# Patient Record
Sex: FEMALE | Race: ASIAN | NOT HISPANIC OR LATINO
[De-identification: names, ages, dates, MRNs, and addresses within clinical notes are randomized per-mention and may not be internally consistent; named-entity substitution may affect disease eponyms.]

---

## 2022-07-06 ENCOUNTER — APPOINTMENT (OUTPATIENT)
Dept: ORTHOPEDIC SURGERY | Facility: CLINIC | Age: 67
End: 2022-07-06

## 2022-07-06 VITALS — WEIGHT: 129 LBS | HEIGHT: 61 IN | BODY MASS INDEX: 24.35 KG/M2

## 2022-07-06 PROBLEM — Z00.00 ENCOUNTER FOR PREVENTIVE HEALTH EXAMINATION: Status: ACTIVE | Noted: 2022-07-06

## 2022-07-06 PROCEDURE — 73000 X-RAY EXAM OF COLLAR BONE: CPT | Mod: RT

## 2022-07-06 PROCEDURE — 99204 OFFICE O/P NEW MOD 45 MIN: CPT | Mod: 25

## 2022-07-06 PROCEDURE — 23500 CLTX CLAVICULAR FX W/O MNPJ: CPT | Mod: LT

## 2022-07-06 PROCEDURE — 73030 X-RAY EXAM OF SHOULDER: CPT | Mod: LT

## 2022-07-06 RX ORDER — DICLOFENAC SODIUM 3 G/100G
3 GEL TOPICAL TWICE DAILY
Qty: 1 | Refills: 3 | Status: ACTIVE | COMMUNITY
Start: 2022-07-06 | End: 1900-01-01

## 2022-07-06 RX ORDER — DICLOFENAC SODIUM 75 MG/1
75 TABLET, DELAYED RELEASE ORAL TWICE DAILY
Qty: 60 | Refills: 0 | Status: ACTIVE | COMMUNITY
Start: 2022-07-06 | End: 1900-01-01

## 2022-07-11 NOTE — HISTORY OF PRESENT ILLNESS
[Sudden] : sudden [Dull/Aching] : dull/aching [Sharp] : sharp [Constant] : constant [Sleep] : sleep [Nothing helps with pain getting better] : Nothing helps with pain getting better [de-identified] : 07/06/2022 \par \par Occupation: teacher\par \par Ms. CUMMINS is a 66 year-old RHD F presenting today for left shoulder pain. Pain and symptoms started 6/24/2022 after a fall down stairs. She has tried using a sling. She denies any numbness/tingling/fevers/chills. \par \par Previous Imaging: x-rays [] : no [FreeTextEntry1] : left clavicle  [FreeTextEntry3] : 06/24/22 [FreeTextEntry5] : onset of pain after falling down a flight of stairs at work on 06/24/22. pt went to [facility] after. received xrays which revealed a fx  [de-identified] : movement  [de-identified] : 06/24/22 [de-identified] : outside [facility] [de-identified] : xrays

## 2022-07-11 NOTE — DISCUSSION/SUMMARY
[Surgical risks reviewed] : Surgical risks reviewed [de-identified] : Discussed r/b/a to nonop versus op treatment\par \par Decided for nonoop management\par \par Instructions: Dx / Natural History\par The patient was advised of the diagnosis.  The natural history of the pathology was explained in full to the patient in layman's terms.  Several different treatment options were discussed and explained in full to the patient including the risks and benefits of both surgical and non-surgical treatments.  All questions and concerns were answered. \par \par RICE\par I explained to the patient that rest, ice, compression, and elevation would benefit them.  They may return to activity after follow-up or when they no longer have any pain.\par \par NSAIDs - OTC\par Patient is to begin over the counter oral anti-inflammatory medications on an as needed basis, as long as there are no medical contraindications.  Patient is counseled on possible GI and blood pressure side effects.\par \par Pain Guide Activities\par The patient was advised to let pain guide the gradual advancement of activities.\par \par Icing\par The patient was advised to apply ice (wrapped in a towel or protective covering) to the area daily (20 minutes at a time, 2-4X/day).

## 2022-07-11 NOTE — PHYSICAL EXAM
[Normal Coordination] : normal coordination [Normal DTR UE/LE] : normal DTR UE/LE  [Normal Sensation] : normal sensation [Normal Mood and Affect] : normal mood and affect [Orientated] : orientated [No Rash] : no rash [Normal Skin] : normal skin [No Ulcers] : no ulcers [No Lesions] : no lesions [No obvious lymphadenopathy in areas examined] : no obvious lymphadenopathy in areas examined [] : no scapular winging [Left] : left shoulder [Fracture] : Fracture [FreeTextEntry3] : Neer Type III distal clavicle fracture

## 2022-07-11 NOTE — WORK
[Fracture] : fracture [Was the competent medical cause of the injury] : was the competent medical cause of the injury [Are consistent with the injury] : are consistent with the injury [Consistent with my objective findings] : consistent with my objective findings [Total] : total [Does not reveal pre-existing condition(s) that may affect treatment/prognosis] : does not reveal pre-existing condition(s) that may affect treatment/prognosis [Cannot return to work because ________] : cannot return to work because [unfilled] [Unknown at this time] : : unknown at this time [Patient] : patient [No Rx restrictions] : No Rx restrictions. [I provided the services listed above] :  I provided the services listed above.

## 2022-07-27 ENCOUNTER — APPOINTMENT (OUTPATIENT)
Dept: ORTHOPEDIC SURGERY | Facility: CLINIC | Age: 67
End: 2022-07-27

## 2022-07-27 PROCEDURE — 99213 OFFICE O/P EST LOW 20 MIN: CPT | Mod: 25

## 2022-07-27 PROCEDURE — 73000 X-RAY EXAM OF COLLAR BONE: CPT | Mod: 50

## 2022-07-27 PROCEDURE — 73030 X-RAY EXAM OF SHOULDER: CPT | Mod: LT

## 2022-07-27 PROCEDURE — 23500 CLTX CLAVICULAR FX W/O MNPJ: CPT | Mod: LT

## 2022-07-27 NOTE — IMAGING
[de-identified] : The patient is a well appearing 66 year year old female of their stated age.\par Neck is supple & nontender to palpation. Negative Spurling's test.\par \par Effected Shoulder \par Inspection:mild swelling\par Scapula Winging: Negative\par Deformity: None\par Erythema: None\par Ecchymosis: None\par Abrasions: None\par Effusion: None\par \par Range of Motion:\par Active Forward Flexion: 15 degrees \par Passive Forward Flexion: 15 degrees \par \par Motor Exam:\par Forward Flexion: 5 out of 5\par Flexion Plane of Scapula: 5 out of 5\par Abduction: 5 out of 5\par Internal Rotation: 5 out of 5\par External Rotation: 5 out of 5\par Distal Motor Strength: 5 out of 5\par \par Stability Testing:\par Anterior: 1+\par Posterior: 1+\par Sulcus N: 1+\par Sulcus ER: 1+\par \par Provocative Tests:\par Drop Arm: Negative\par Impingement: Negative\par [place]: Negative\par X-Arm Adduction: Negative\par Belly Press: Negative\par Bear Hug: Negative\par Lift Off: Negative\par Apprehension: Negative\par Relocation: Negative\par Posterior Load & Shift: Negative\par \par Palpation:\par AC Joint: Nontender\par Clavicle: Mild TTP distal clavicle\par SC Joint: Nontender\par Bicepital Groove: Nontender\par Coracoid Process: Nontender\par Pectoralis Minor Tendon: Nontender\par Pectoralis Major Tendon: Nontender & palpably intact\par Latissimus Dorsi: Nontender \par Proximal Humerus: Nontender\par Scapula Body: Nontender\par Medial Scapula Boarder: Nontender\par Scapula Spine: Nontender\par \par Neurologic Exam: Sensation to Light Touch:\par Axillary: Grossly intact\par Ulnar: Grossly intact\par Radial: Grossly intact\par Median: Grossly intact\par Other:  N/A\par \par Circulatory/Pulses:\par Ulnar: 2+\par Radial: 2+\par Other Pertinent Findings: None\par \par Contralateral Shoulder\par Range of Motion:\par Active Forward Flexion: 180 degrees \par Active Abduction: 180 degrees \par Passive Forward Flexion: 180 degrees \par Passive Abduction: 180 degrees \par ER @ 90 degrees: 90 degrees\par IR @ 90 degrees: 45 degrees\par ER @ 0 degrees: 50 degrees\par \par Motor Exam:\par Forward Flexion: 5 out of 5\par Flexion Plane of Scapula: 5 out of 5\par Abduction: 5 out of 5\par Internal Rotation: 5 out of 5\par External Rotation: 5 out of 5\par Distal Motor Strength: 5 out of 5\par \par Stability Testing:\par Anterior: 1+\par Posterior: 1+\par Sulcus N: 1+\par Sulcus ER: 1+\par \par Other Pertinent Findings: None\par \par  [Left] : left shoulder [The fracture is in acceptable alignment. There is progression in healing seen] : The fracture is in acceptable alignment. There is progression in healing seen

## 2022-07-27 NOTE — HISTORY OF PRESENT ILLNESS
[Sudden] : sudden [Intermittent] : intermittent [Household chores] : household chores [Work] : work [Sleep] : sleep [Rest] : rest [Not working due to injury] : Work status: not working due to injury [de-identified] : 07/27/2022 \par \par GEMINI is presenting today for followup. Pain and symptoms are worse during the morning. She denies any numbness/tingling/fevers/chills.\par  [] : no [FreeTextEntry1] : left clavicle  [FreeTextEntry5] : pt reports pain is better since last visit but is still experiencing pain with movement of left arm.\par unable to lay on left side.  [de-identified] : movement of arm

## 2022-07-27 NOTE — DISCUSSION/SUMMARY
[de-identified] : Assessment: GEMINI is a 66 year year old woman with RT clavicular pain and radiographic/physical exam findings consistent with clavicular fx. The patient’s condition is acute and worse in the morning\par \par Documents/Results Reviewed Today: none\par \par Tests/Studies Independently Interpreted Today: clavicular xr\par \par Pertinent findings include: mild swelling and mild TTP distal clavicle\par \par Confounding medical conditions/concerns: none\par \par \par Plan:  \par Tests Ordered: none\par \par Prescription Medications Ordered: none\par \par Braces/DME Ordered: none\par \par Activity/Work/Sports Status: no work until re-evaluation\par \par Additional Instructions: none\par \par Follow-Up: 4 weeks, repeat xr\par

## 2022-08-24 ENCOUNTER — APPOINTMENT (OUTPATIENT)
Dept: ORTHOPEDIC SURGERY | Facility: CLINIC | Age: 67
End: 2022-08-24

## 2022-08-24 DIAGNOSIS — M25.512 PAIN IN LEFT SHOULDER: ICD-10-CM

## 2022-08-24 DIAGNOSIS — M75.42 IMPINGEMENT SYNDROME OF LEFT SHOULDER: ICD-10-CM

## 2022-08-24 DIAGNOSIS — M89.8X1 OTHER SPECIFIED DISORDERS OF BONE, SHOULDER: ICD-10-CM

## 2022-08-24 DIAGNOSIS — S42.009A FRACTURE OF UNSPECIFIED PART OF UNSPECIFIED CLAVICLE, INITIAL ENCOUNTER FOR CLOSED FRACTURE: ICD-10-CM

## 2022-08-24 DIAGNOSIS — M54.12 RADICULOPATHY, CERVICAL REGION: ICD-10-CM

## 2022-08-24 PROCEDURE — 99024 POSTOP FOLLOW-UP VISIT: CPT | Mod: NC

## 2022-08-24 PROCEDURE — 73000 X-RAY EXAM OF COLLAR BONE: CPT | Mod: 50

## 2022-08-24 PROCEDURE — 73030 X-RAY EXAM OF SHOULDER: CPT | Mod: LT

## 2022-08-24 NOTE — DISCUSSION/SUMMARY
[de-identified] : Patient and I discussed their symptoms, LT clavicle pain and neck pain. Discussed findings of today's exam and possible causes of patient's pain. Educated patient on their most probable diagnosis of  impingement syndrome of LT shoulder, clavicle fx, and cervical radiculopathy. Reviewed possible courses of treatment, and we collaboratively decided best course of treatment at this time will include:\par \par 1. MRI LT shoulder \par 2. MRI cervical spine \par \par \par Instructions: Dx / Natural History\par The patient was advised of the diagnosis.  The natural history of the pathology was explained in full to the patient in layman's terms.  Several different treatment options were discussed and explained in full to the patient including the risks and benefits of both surgical and non-surgical treatments.  All questions and concerns were answered. \par \par RICE\par I explained to the patient that rest, ice, compression, and elevation would benefit them.  They may return to activity after follow-up or when they no longer have any pain.\par \par NSAIDs - OTC\par Patient is to begin over the counter oral anti-inflammatory medications on an as needed basis, as long as there are no medical contraindications.  Patient is counseled on possible GI and blood pressure side effects.\par \par Pain Guide Activities\par The patient was advised to let pain guide the gradual advancement of activities.\par \par Icing\par The patient was advised to apply ice (wrapped in a towel or protective covering) to the area daily (20 minutes at a time, 2-4X/day).\par \par Follow up after MRI. \par \par All of the patient's questions were answered to Her satisfaction. Diagnoses and potential treatments were reviewed. She agreed with the plan and would like to move forward with it.

## 2022-08-24 NOTE — IMAGING
[de-identified] : The patient is a well appearing 66 year old female of their stated age.\par \par General: in no acute distress, seated comfortably, moving easily\par Skin: No discoloration, rashes; on palpation skin is dry, \par Neuro: Normal sensation all dermatomes, motor all myotomes\par Vascular: Normal pulses, no edema, normal temperature\par Coordination and balance: Normal\par Psych: normal mood and affect, non pressured speech, alert and oriented x3\par \par LT Shoulder \par Inspection:\par Scapula Winging: Negative\par Deformity: None\par Erythema: None\par Ecchymosis: None\par Abrasions: None\par Effusion: None\par \par Range of Motion:\par Active Forward Flexion: 160 degrees \par Passive Forward Flexion: 170 degrees \par Active IR : L4\par Passive ER : 30 degrees\par \par Motor Exam:\par Forward Flexion: 4+ out of 5\par Flexion Plane of Scapula: 5 out of 5\par Abduction: 4+ out of 5\par Internal Rotation: 5 out of 5\par External Rotation: 4+ out of 5\par Distal Motor Strength: 5 out of 5\par \par Stability Testing:\par Anterior: 1+\par Posterior: 1+\par Sulcus N: 1+\par Sulcus ER: 1+\par \par Provocative Tests:\par Drop Arm: Negative\par [place]/Impingement: Positive\par [place]: Positive\par X-Arm Adduction: Negative\par Belly Press: Negative\par Bear Hug: Negative\par Lift Off: Negative\par Apprehension: Negative\par Relocation: Negative\par Posterior Load & Shift: Negative\par \par Palpation:\par AC Joint: Nontender\par Distal clavicle: TTP\par SC Joint: Nontender\par Bicepital Groove: Positive\par Coracoid Process: Nontender\par Pectoralis Minor Tendon: Nontender\par Pectoralis Major Tendon: Nontender & palpably intact\par Latissimus Dorsi: Nontender \par Proximal Humerus: Positive\par Scapula Body: Nontender\par Medial Scapula Boarder: Nontender\par Scapula Spine: Nontender\par \par Neurologic Exam: Sensation to Light Touch:\par Axillary: Grossly intact\par Ulnar: Grossly intact\par Radial: Grossly intact\par Median: Grossly intact\par Other:  N/A\par \par Circulatory/Pulses:\par Ulnar: 2+\par Radial: 2+\par Other Pertinent Findings: None\par \par Contralateral Shoulder\par Range of Motion:\par Active Forward Flexion: 180 degrees \par Active Abduction: 180 degrees \par Passive Forward Flexion: 180 degrees \par Passive Abduction: 180 degrees \par ER @ 90 degrees: 90 degrees\par IR @ 90 degrees: 45 degrees\par ER @ 0 degrees: 50 degrees\par \par Motor Exam:\par Forward Flexion: 5 out of 5\par Flexion Plane of Scapula: 5 out of 5\par Abduction: 5 out of 5\par Internal Rotation: 5 out of 5\par External Rotation: 5 out of 5\par Distal Motor Strength: 5 out of 5\par \par Stability Testing:\par Anterior: 1+\par Posterior: 1+\par Sulcus N: 1+\par Sulcus ER: 1+\par \par Other Pertinent Findings: None\par

## 2022-08-24 NOTE — PHYSICAL EXAM
[NL (45)] : right lateral flexion 45 degrees [NL (80)] : right lateral rotation 80 degrees [5___] : right grasp 5[unfilled]/5 [Biceps 2+] : biceps 2+ [Triceps 2+] : triceps 2+ [Brachioradialis 2+] : brachioradialis 2+ [] : no rhomboid tenderness

## 2022-09-21 ENCOUNTER — APPOINTMENT (OUTPATIENT)
Dept: ORTHOPEDIC SURGERY | Facility: CLINIC | Age: 67
End: 2022-09-21

## 2022-09-21 DIAGNOSIS — R22.42 LOCALIZED SWELLING, MASS AND LUMP, LEFT LOWER LIMB: ICD-10-CM

## 2022-09-21 DIAGNOSIS — M53.9 DORSOPATHY, UNSPECIFIED: ICD-10-CM

## 2022-09-21 PROCEDURE — 99214 OFFICE O/P EST MOD 30 MIN: CPT | Mod: 24

## 2022-09-21 PROCEDURE — 99072 ADDL SUPL MATRL&STAF TM PHE: CPT

## 2022-09-21 NOTE — HISTORY OF PRESENT ILLNESS
[Work related] : work related [Dull/Aching] : dull/aching [Sitting] : sitting [Standing] : standing [Not working due to injury] : Work status: not working due to injury [de-identified] : 09/21/2022 \par \valeri SINGLETARY is presenting today for followup. Pain and symptoms are similar to the previous visit. She denies any numbness/tingling/fevers/chills. She underwent an MRI cervical spine since last visit, and is here to discuss the imaging results. She is also presenting for mass on LT lower leg, she noticed mass after initial fall, has not improved since DOI. Pt denies SOB, no calf swelling or pain, no fever/chills, no recent travel.  [] : no [FreeTextEntry3] : 06/24/22 [FreeTextEntry5] : pt states pain is worse since the last visit. pain localized to back of neck. [de-identified] : MRI ([facility])

## 2022-09-21 NOTE — WORK
[Total] : total [Does not reveal pre-existing condition(s) that may affect treatment/prognosis] : does not reveal pre-existing condition(s) that may affect treatment/prognosis [Cannot return to work because ________] : cannot return to work because [unfilled] [Unknown at this time] : : unknown at this time [Patient] : patient [I provided the services listed above] :  I provided the services listed above. [FreeTextEntry1] : good

## 2022-09-21 NOTE — PHYSICAL EXAM
[NL (45)] : right lateral flexion 45 degrees [NL (80)] : right lateral rotation 80 degrees [Biceps 2+] : biceps 2+ [Triceps 2+] : triceps 2+ [Brachioradialis 2+] : brachioradialis 2+ [Left] : left foot and ankle [NL (40)] : plantar flexion 40 degrees [NL 30)] : inversion 30 degrees [NL (20)] : eversion 20 degrees [5___] : [facility] 5[unfilled]/5 [2+] : posterior tibialis pulse: 2+ [Normal] : saphenous nerve sensation normal [] : non-antalgic [FreeTextEntry3] : ecchymosis and swelling with palpable mass on the medial distal lower leg just above the ankle

## 2022-09-21 NOTE — DISCUSSION/SUMMARY
[de-identified] : Patient and I discussed their symptoms, neck pain and LT lower leg mass. Discussed findings of today's exam and possible causes of patient's pain. Educated patient on their most probable diagnosis of DDD and LT lower leg mass. Reviewed possible courses of treatment, and we collaboratively decided best course of treatment at this time will include:\par \par 1. Will refer to pain management \par 2. OTC NSAIDs prn  \par 3.MRI LT lower leg\par 4. OOW for 4 weeks \par \par Instructions: Dx / Natural History\par The patient was advised of the diagnosis.  The natural history of the pathology was explained in full to the patient in layman's terms.  Several different treatment options were discussed and explained in full to the patient including the risks and benefits of both surgical and non-surgical treatments.  All questions and concerns were answered. \par \par RICE\par I explained to the patient that rest, ice, compression, and elevation would benefit them.  They may return to activity after follow-up or when they no longer have any pain.\par \par NSAIDs - OTC\par Patient is to begin over the counter oral anti-inflammatory medications on an as needed basis, as long as there are no medical contraindications.  Patient is counseled on possible GI and blood pressure side effects.\par \par Pain Guide Activities\par The patient was advised to let pain guide the gradual advancement of activities.\par \par Icing\par The patient was advised to apply ice (wrapped in a towel or protective covering) to the area daily (20 minutes at a time, 2-4X/day).\par \par Follow up after MRI. \par \par All of the patient's questions were answered to Her satisfaction. Diagnoses and potential treatments were reviewed. She agreed with the plan and would like to move forward with it. \par \par Due to this patient expressing significant pain, I am prescribing an iceless cold/heat compression therapy device for at home use to be used 3-5 times per day at 40 degrees for 35 days. I would like my patient to begin with simultaneous cold & compression therapy at 10mm pressure. At the patients follow up I will determine whether they should continue with cold, or if they should transition to contrast cold/heat compression therapy. Unlike a conventional cold therapy unit that requires ice, the ThermX iceless device is set to a prescribed temperature that it will remain throughout the entire duration of use, whether that be cold compression, heat compression, or contrast compression. Cold therapy units that depend on ice melt over a very short period and do not provide compression which limits the compliance and effectiveness for pain/inflammation reduction that I am targeting for my patient. I have reached out to [facility] to supply this device as they are the exclusive provider of the ThermX and the patient will be contacted and instructed on how to utilize the device. \par \par History, physical exam, imaging, assessment and plan documented by Greyson Marin. The documentation recorded by the scribe accurately reflects the service I, Jeremias Hurley MD, personally performed and the decisions made by me.

## 2022-09-21 NOTE — DATA REVIEWED
[Left] : left [Shoulder] : shoulder [MRI] : MRI [Cervical Spine] : cervical spine [Report was reviewed and noted in the chart] : The report was reviewed and noted in the chart [I independently reviewed and interpreted images and report] : I independently reviewed and interpreted images and report [FreeTextEntry1] : 1.  Age-indeterminate nonunited distal clavicle fracture with apparent pseudoarthrosis and marrow edema of the medial fragment.\par 2.  Low-grade AC joint separation.\par 3.  Supraspinatus and infraspinatus tendinosis without tear.\par 4.  Subscapularis tendinosis with probable articular surface partial tear and questionable superimposed biceps pulley lesion.\par 5.  Intra-articular long head biceps tendinosis with questionable partial medial subluxation.\par 6.  Bursal inflammation.\par 7.  Downsloping acromion may predispose to impingement. [FreeTextEntry2] : 1. C3-4 right-sided disc herniation encroaching upon the forming right C4 nerve root.\par \par 2. C4-5 central disc herniation indenting the cord.\par \par 3. C5-6 annular bulge abutting the cord.\par \par 4. C6-7 broad-based disc herniation with uncinate spurring flattening the cord with moderate right and mild left foraminal narrowing.

## 2022-10-11 ENCOUNTER — APPOINTMENT (OUTPATIENT)
Dept: PAIN MANAGEMENT | Facility: CLINIC | Age: 67
End: 2022-10-11

## 2022-10-11 VITALS — BODY MASS INDEX: 24.35 KG/M2 | HEIGHT: 61 IN | WEIGHT: 129 LBS

## 2022-10-11 DIAGNOSIS — M54.12 RADICULOPATHY, CERVICAL REGION: ICD-10-CM

## 2022-10-11 DIAGNOSIS — M54.2 CERVICALGIA: ICD-10-CM

## 2022-10-11 PROCEDURE — J3490M: CUSTOM

## 2022-10-11 PROCEDURE — 20552 NJX 1/MLT TRIGGER POINT 1/2: CPT

## 2022-10-11 PROCEDURE — 99072 ADDL SUPL MATRL&STAF TM PHE: CPT

## 2022-10-11 PROCEDURE — 99204 OFFICE O/P NEW MOD 45 MIN: CPT | Mod: 25

## 2022-10-11 NOTE — CONSULT LETTER
[Dear  ___] : Dear  [unfilled], [Courtesy Letter:] : I had the pleasure of seeing your patient, [unfilled], in my office today. [Please see my note below.] : Please see my note below. [Consult Closing:] : Thank you very much for allowing me to participate in the care of this patient.  If you have any questions, please do not hesitate to contact me. [Sincerely,] : Sincerely, [FreeTextEntry3] : Brandyn Barton MD\par

## 2022-10-11 NOTE — HISTORY OF PRESENT ILLNESS
[Neck] : neck [Work related] : work related [8] : 8 [Dull/Aching] : dull/aching [Radiating] : radiating [Constant] : constant [Household chores] : household chores [Work] : work [Sleep] : sleep [Nothing helps with pain getting better] : Nothing helps with pain getting better [Sitting] : sitting [Standing] : standing [Walking] : walking [Bending forward] : bending forward [Not working due to injury] : Work status: not working due to injury [FreeTextEntry1] : Neck pain with radiation to LUE. Pain began during therapy following a work related incident in June 2022 in which she was using the bathroom at work and while descending stairs to the bathroom, but on the third step from the bottom she feel and she states she ' screamed'. No LOC. No prior episodes. She is OOW since the incident. MRI reviewed. Pain is worse with standing ( prolonged ) , sitting (prolonged > 1 hour).  PT helped with shoulder but she states may have made cervical issues worse. She has an MRI pending for a mass on her LLE. She is following with Dr. Hurley for this. This developed from the fall, she states it was an area where she fell. She had a ultrasound from her PMD. She states she was told it was negative.  [] : no [FreeTextEntry3] : 6/24/22 [FreeTextEntry7] : left shoulder  [de-identified] : c mri

## 2022-10-11 NOTE — DISCUSSION/SUMMARY
[Surgical risks reviewed] : Surgical risks reviewed [de-identified] : After discussing various treatment options with the patient including but not limited to oral medications, physical therapy, exercise,\par modalities as well as interventional spinal injections, we have decided with the following plan\par \par I personally reviewed the MRI/CT scan images and agree with the radiologist's report. The\par radiological findings were discussed with the patient.\par \par \par The risks, benefits, contents and alternatives to injection were explained in full to the patient. Risks outlined include but are not\par limited to infection,sepsis, bleeding, post-dural puncture headache, nerve damage, temporary increase in pain, syncopal episode,\par failure to resolve symptoms, allergic reaction, symptom recurrence, and elevation of blood sugar in diabetics. Cortisone may cause\par immunosuppression. Patient understands the risks. All questions were answered. After discussion of options, patient requested\par an injection. Information regarding the injection was given to the patient. Which medications to stop prior to the injection was\par explained to the patient as well.\par \par Follow up in 1-2 weeks post injection for re-evaluation.\par \par  Conservative Care\par Continue Home exercises, stretching, activity modification, physical therapy, and conservative care.\par \par Proceed with C7-T1 KIM\par

## 2022-10-19 ENCOUNTER — APPOINTMENT (OUTPATIENT)
Dept: ORTHOPEDIC SURGERY | Facility: CLINIC | Age: 67
End: 2022-10-19